# Patient Record
Sex: FEMALE
[De-identification: names, ages, dates, MRNs, and addresses within clinical notes are randomized per-mention and may not be internally consistent; named-entity substitution may affect disease eponyms.]

---

## 2020-05-27 ENCOUNTER — TRANSCRIPTION ENCOUNTER (OUTPATIENT)
Age: 55
End: 2020-05-27

## 2020-07-15 ENCOUNTER — APPOINTMENT (OUTPATIENT)
Dept: GYNECOLOGIC ONCOLOGY | Facility: CLINIC | Age: 55
End: 2020-07-15
Payer: COMMERCIAL

## 2020-07-15 VITALS
DIASTOLIC BLOOD PRESSURE: 85 MMHG | WEIGHT: 127 LBS | OXYGEN SATURATION: 99 % | HEART RATE: 106 BPM | HEIGHT: 62 IN | SYSTOLIC BLOOD PRESSURE: 157 MMHG | BODY MASS INDEX: 23.37 KG/M2

## 2020-07-15 DIAGNOSIS — I25.10 ATHEROSCLEROTIC HEART DISEASE OF NATIVE CORONARY ARTERY W/OUT ANGINA PECTORIS: ICD-10-CM

## 2020-07-15 DIAGNOSIS — Z80.6 FAMILY HISTORY OF LEUKEMIA: ICD-10-CM

## 2020-07-15 DIAGNOSIS — Z80.7 FAMILY HISTORY OF OTHER MALIGNANT NEOPLASMS OF LYMPHOID, HEMATOPOIETIC AND RELATED TISSUES: ICD-10-CM

## 2020-07-15 DIAGNOSIS — Z86.018 PERSONAL HISTORY OF OTHER BENIGN NEOPLASM: ICD-10-CM

## 2020-07-15 DIAGNOSIS — D86.9 SARCOIDOSIS, UNSPECIFIED: ICD-10-CM

## 2020-07-15 DIAGNOSIS — E78.5 HYPERLIPIDEMIA, UNSPECIFIED: ICD-10-CM

## 2020-07-15 DIAGNOSIS — Z78.9 OTHER SPECIFIED HEALTH STATUS: ICD-10-CM

## 2020-07-15 DIAGNOSIS — R91.8 OTHER NONSPECIFIC ABNORMAL FINDING OF LUNG FIELD: ICD-10-CM

## 2020-07-15 PROCEDURE — 57456 ENDOCERV CURETTAGE W/SCOPE: CPT | Mod: 59

## 2020-07-15 PROCEDURE — 99204 OFFICE O/P NEW MOD 45 MIN: CPT | Mod: 25

## 2020-07-15 RX ORDER — ASPIRIN 81 MG
81 TABLET, DELAYED RELEASE (ENTERIC COATED) ORAL
Refills: 0 | Status: ACTIVE | COMMUNITY

## 2020-07-15 RX ORDER — ROSUVASTATIN CALCIUM 10 MG/1
10 TABLET, FILM COATED ORAL
Refills: 0 | Status: ACTIVE | COMMUNITY

## 2020-07-15 NOTE — HISTORY OF PRESENT ILLNESS
[FreeTextEntry1] : This 56yo , c/s x 2, LMP at 51 referred by Dr. Gupta for an abnormal cervical pap smear on 6/15/20 showing ASCUS, HPV HR+. ECC on 20 revealed solely benign squamous mucosa. She is also followed for a small right ovarian cyst and stable echogenic focus in right ovary. Recent  was normal at 8. Patient reports she thinks she's had abnormal pap smears in the past which resolved. Denies LEEP or cryotherapy. Denies smoking. She has been under a great amount of stress as she was recently diagnosed with CAD and possible sarcoidosis. She is also very anxious about HPV and her abnormal pap smear. She believes she had recent negative HIV testing.  \par \par Mammo-2019-reports neg\par [id number]\par DEXA-2019

## 2020-07-15 NOTE — ASSESSMENT
[FreeTextEntry1] : We discussed the etiology of cervical dysplasia and the relationship between the human papilloma virus (HPV) and cervical dysplasia and cancer.  I explained that anogenital transmission of HPV normally follows mucosal-to-mucosal contact.  I also explained what is understood to date with regards to how the HPV virus causes the pathological abnormailities that are seen in cervical dysplasia and cancer.  In addition to HPV, we discussed other risk factors such as smoking, use of oral contraceptives, and induced or acquired immune dysfunction.\par

## 2020-07-15 NOTE — CHIEF COMPLAINT
[FreeTextEntry1] : [facility]\par \par [facility] [phone] at [address] [place] [phone]\par

## 2020-07-15 NOTE — PROCEDURE
[Colposcopy] : colposcopy [ASCUS] : ASCUS [HPV high risk] : PCR positive for high risk HPV [Patient] : the patient [No Abnormalities] : no abnormalities [Verbal Consent] : verbal consent was obtained prior to the procedure and is detailed in the patient's record [ECC Done] : an Endocervical curettage was performed.  [FreeTextEntry3] : Colposcopy of the cervix performed, mild cervical stenosis present, dilation performed with ECC brush

## 2020-07-15 NOTE — PHYSICAL EXAM
[Normal] : Uterus: Normal size, no tenderness, no masses [de-identified] : Patient was interviewed and examined with chaperone present. Name of chaperone: Nani East

## 2020-07-15 NOTE — END OF VISIT
[FreeTextEntry3] : Written by Nani LIMA, acting as a scribe for Dr. Gómez Dixon.\par This note accurately reflects the work and decisions made by me.\par

## 2020-07-20 LAB — CORE LAB BIOPSY: NORMAL

## 2020-11-12 ENCOUNTER — TRANSCRIPTION ENCOUNTER (OUTPATIENT)
Age: 55
End: 2020-11-12

## 2021-01-27 PROBLEM — R87.610 ASCUS WITH POSITIVE HIGH RISK HPV CERVICAL: Status: ACTIVE | Noted: 2020-07-15

## 2021-01-27 NOTE — PROCEDURE
[Cervical Pap] : cervical pap smear  [Abnormal Pap Smear] : an abnormal pap smear [Patient] : the patient [Verbal Consent] : verbal consent was obtained prior to the procedure and is detailed in the patient's record [Yes] : the specimen was sent to pathology [No Complications] : none [Tolerated Well] : the patient tolerated the procedure well

## 2021-01-29 ENCOUNTER — APPOINTMENT (OUTPATIENT)
Dept: GYNECOLOGIC ONCOLOGY | Facility: CLINIC | Age: 56
End: 2021-01-29
Payer: COMMERCIAL

## 2021-01-29 VITALS
RESPIRATION RATE: 16 BRPM | HEIGHT: 62 IN | DIASTOLIC BLOOD PRESSURE: 78 MMHG | BODY MASS INDEX: 23.37 KG/M2 | SYSTOLIC BLOOD PRESSURE: 131 MMHG | OXYGEN SATURATION: 100 % | HEART RATE: 95 BPM | WEIGHT: 127 LBS

## 2021-01-29 DIAGNOSIS — R87.810 ATYPICAL SQUAMOUS CELLS OF UNDETERMINED SIGNIFICANCE ON CYTOLOGIC SMEAR OF CERVIX (ASC-US): ICD-10-CM

## 2021-01-29 DIAGNOSIS — R87.610 ATYPICAL SQUAMOUS CELLS OF UNDETERMINED SIGNIFICANCE ON CYTOLOGIC SMEAR OF CERVIX (ASC-US): ICD-10-CM

## 2021-01-29 PROCEDURE — 99072 ADDL SUPL MATRL&STAF TM PHE: CPT

## 2021-01-29 PROCEDURE — 99214 OFFICE O/P EST MOD 30 MIN: CPT

## 2021-01-30 ENCOUNTER — TRANSCRIPTION ENCOUNTER (OUTPATIENT)
Age: 56
End: 2021-01-30

## 2021-02-01 LAB — HPV HIGH+LOW RISK DNA PNL CVX: NOT DETECTED

## 2021-02-02 NOTE — END OF VISIT
[FreeTextEntry3] : Written by Mandie Burrell, acting as a scribe for Dr. Gómez Dixon \par This note accurately reflects the work and decisions made by me.

## 2021-02-02 NOTE — ASSESSMENT
[FreeTextEntry1] : Cervical pap smear performed today. I have advised the patient to call my office next week for her pap smear results. Patient is aware that if it is abnormal she will have to return to discuss results. I advised the patient that if her results are normal, I am recommending she follow up with her primary gynecologist in 1 year to resume routine gynecological care. Patient stated she understood and agreed to comply.

## 2021-02-02 NOTE — REASON FOR VISIT
[FreeTextEntry1] : [place] Location \par \par [facility] [place]. [phone]\par [address] [place] [address]

## 2021-02-02 NOTE — PHYSICAL EXAM
[Normal] : Bimanual Exam: Normal [de-identified] : Mandie Burrell Medical assistant chaperoned during gynecologic exam.

## 2021-02-02 NOTE — HISTORY OF PRESENT ILLNESS
[FreeTextEntry1] : This 50 year old was referred in July by Dr. Gupta for an abnormal cervical pap smear from 6/15/2020 which showed ASCUS, HPV HR+. ECC on 6/30/2020 revealed soley benign squamous mucosa. Patient reported thinking she had abnormal pap smears in the past which resolved. I performed a colposcopy of the cervix and performed an ECC which I made the patient aware that it might come out insufficient due to cervical stenosis but that either way I was not concerned of a malignancy. ECC revealed superficial fragments of atrophic squamous epithelium with inflammation and reactive changes. Rare isolated cells compatible with endocervical epithelium. Patient returns to the office today for a 6 month follow up cervical pap smear. \par \par Mammogram- 9/10/2020 benign. \par DEXA 2019\par Colonoscopy 2017- negative

## 2021-02-05 LAB — CYTOLOGY CVX/VAG DOC THIN PREP: ABNORMAL

## 2021-03-25 ENCOUNTER — TRANSCRIPTION ENCOUNTER (OUTPATIENT)
Age: 56
End: 2021-03-25

## 2022-11-03 ENCOUNTER — OFFICE (OUTPATIENT)
Dept: URBAN - METROPOLITAN AREA CLINIC 109 | Facility: CLINIC | Age: 57
Setting detail: OPHTHALMOLOGY
End: 2022-11-03
Payer: COMMERCIAL

## 2022-11-03 DIAGNOSIS — H01.001: ICD-10-CM

## 2022-11-03 DIAGNOSIS — H01.002: ICD-10-CM

## 2022-11-03 DIAGNOSIS — H00.14: ICD-10-CM

## 2022-11-03 PROCEDURE — 92285 EXTERNAL OCULAR PHOTOGRAPHY: CPT | Performed by: OPHTHALMOLOGY

## 2022-11-03 PROCEDURE — 99213 OFFICE O/P EST LOW 20 MIN: CPT | Performed by: OPHTHALMOLOGY

## 2022-11-03 ASSESSMENT — CONFRONTATIONAL VISUAL FIELD TEST (CVF)
OD_FINDINGS: FULL
OS_FINDINGS: FULL

## 2022-11-03 ASSESSMENT — LID EXAM ASSESSMENTS: OD_BLEPHARITIS: RLL RUL 1+

## 2022-11-04 ASSESSMENT — REFRACTION_AUTOREFRACTION
OS_CYLINDER: -0.75
OS_AXIS: 083
OS_SPHERE: +1.75
OD_AXIS: 110
OD_CYLINDER: -1.25
OD_SPHERE: +1.75

## 2022-11-04 ASSESSMENT — KERATOMETRY
OS_AXISANGLE_DEGREES: 162
OS_K1POWER_DIOPTERS: 44.25
OD_K2POWER_DIOPTERS: 44.75
OD_K1POWER_DIOPTERS: 44.25
OD_AXISANGLE_DEGREES: 050
OS_K2POWER_DIOPTERS: 44.50

## 2022-11-04 ASSESSMENT — SPHEQUIV_DERIVED
OD_SPHEQUIV: 1.125
OS_SPHEQUIV: 1.375

## 2022-11-04 ASSESSMENT — VISUAL ACUITY
OD_BCVA: 20/50-2
OS_BCVA: 20/30-1

## 2022-11-04 ASSESSMENT — AXIALLENGTH_DERIVED
OS_AL: 22.7649
OD_AL: 22.8129

## 2023-03-03 ENCOUNTER — OFFICE (OUTPATIENT)
Dept: URBAN - METROPOLITAN AREA CLINIC 12 | Facility: CLINIC | Age: 58
Setting detail: OPHTHALMOLOGY
End: 2023-03-03
Payer: COMMERCIAL

## 2023-03-03 DIAGNOSIS — H43.393: ICD-10-CM

## 2023-03-03 DIAGNOSIS — D86.9: ICD-10-CM

## 2023-03-03 DIAGNOSIS — H40.013: ICD-10-CM

## 2023-03-03 DIAGNOSIS — L71.0: ICD-10-CM

## 2023-03-03 DIAGNOSIS — H25.13: ICD-10-CM

## 2023-03-03 DIAGNOSIS — H04.123: ICD-10-CM

## 2023-03-03 PROCEDURE — 92014 COMPRE OPH EXAM EST PT 1/>: CPT | Performed by: OPHTHALMOLOGY

## 2023-03-03 PROCEDURE — 92083 EXTENDED VISUAL FIELD XM: CPT | Performed by: OPHTHALMOLOGY

## 2023-03-03 PROCEDURE — 92133 CPTRZD OPH DX IMG PST SGM ON: CPT | Performed by: OPHTHALMOLOGY

## 2023-03-03 ASSESSMENT — SPHEQUIV_DERIVED
OD_SPHEQUIV: 1.125
OS_SPHEQUIV: 1.5

## 2023-03-03 ASSESSMENT — KERATOMETRY
OD_K2POWER_DIOPTERS: 44.25
OS_AXISANGLE_DEGREES: 170
OD_AXISANGLE_DEGREES: 031
OD_K1POWER_DIOPTERS: 44.00
OS_K2POWER_DIOPTERS: 44.75
OS_K1POWER_DIOPTERS: 44.00

## 2023-03-03 ASSESSMENT — REFRACTION_AUTOREFRACTION
OD_AXIS: 109
OS_SPHERE: +2.00
OS_AXIS: 084
OS_CYLINDER: -1.00
OD_CYLINDER: -1.25
OD_SPHERE: +1.75

## 2023-03-03 ASSESSMENT — PACHYMETRY
OD_CT_CORRECTION: 0
OD_CT_UM: 545
OS_CT_UM: 540
OS_CT_CORRECTION: 0

## 2023-03-03 ASSESSMENT — AXIALLENGTH_DERIVED
OD_AL: 22.9424
OS_AL: 22.7196

## 2023-03-03 ASSESSMENT — TONOMETRY
OS_IOP_MMHG: 16
OD_IOP_MMHG: 17

## 2023-03-03 ASSESSMENT — VISUAL ACUITY
OS_BCVA: 20/40+1
OD_BCVA: 20/50+1

## 2023-03-03 ASSESSMENT — CONFRONTATIONAL VISUAL FIELD TEST (CVF)
OS_FINDINGS: FULL
OD_FINDINGS: FULL

## 2023-03-05 ENCOUNTER — OFFICE (OUTPATIENT)
Dept: URBAN - METROPOLITAN AREA CLINIC 12 | Facility: CLINIC | Age: 58
Setting detail: OPHTHALMOLOGY
End: 2023-03-05
Payer: COMMERCIAL

## 2023-03-05 DIAGNOSIS — B30.9: ICD-10-CM

## 2023-03-05 PROBLEM — H11.151 PINGUECULA; RIGHT EYE: Status: ACTIVE | Noted: 2023-03-05

## 2023-03-05 PROCEDURE — 92012 INTRM OPH EXAM EST PATIENT: CPT | Performed by: OPTOMETRIST

## 2023-03-05 ASSESSMENT — REFRACTION_AUTOREFRACTION
OS_SPHERE: +2.00
OD_AXIS: 110
OS_CYLINDER: -1.00
OD_SPHERE: +1.75
OD_CYLINDER: -1.00
OS_AXIS: 082

## 2023-03-05 ASSESSMENT — AXIALLENGTH_DERIVED
OD_AL: 22.9398
OS_AL: 22.6348

## 2023-03-05 ASSESSMENT — SPHEQUIV_DERIVED
OS_SPHEQUIV: 1.5
OD_SPHEQUIV: 1.25

## 2023-03-05 ASSESSMENT — VISUAL ACUITY
OS_BCVA: 20/30
OD_BCVA: 20/50-1

## 2023-03-05 ASSESSMENT — KERATOMETRY
OD_AXISANGLE_DEGREES: 041
OD_K2POWER_DIOPTERS: 44.50
OD_K1POWER_DIOPTERS: 43.50
OS_AXISANGLE_DEGREES: 178
OS_K2POWER_DIOPTERS: 44.75
OS_K1POWER_DIOPTERS: 44.50

## 2023-03-05 ASSESSMENT — PACHYMETRY
OS_CT_CORRECTION: 0
OD_CT_CORRECTION: 0
OD_CT_UM: 545
OS_CT_UM: 540

## 2023-03-05 ASSESSMENT — CONFRONTATIONAL VISUAL FIELD TEST (CVF)
OD_FINDINGS: FULL
OS_FINDINGS: FULL

## 2024-03-08 ENCOUNTER — OFFICE (OUTPATIENT)
Dept: URBAN - METROPOLITAN AREA CLINIC 12 | Facility: CLINIC | Age: 59
Setting detail: OPHTHALMOLOGY
End: 2024-03-08
Payer: COMMERCIAL

## 2024-03-08 DIAGNOSIS — H40.013: ICD-10-CM

## 2024-03-08 DIAGNOSIS — D86.9: ICD-10-CM

## 2024-03-08 DIAGNOSIS — H04.123: ICD-10-CM

## 2024-03-08 DIAGNOSIS — L71.0: ICD-10-CM

## 2024-03-08 DIAGNOSIS — H10.45: ICD-10-CM

## 2024-03-08 DIAGNOSIS — H11.151: ICD-10-CM

## 2024-03-08 DIAGNOSIS — H25.13: ICD-10-CM

## 2024-03-08 DIAGNOSIS — H43.393: ICD-10-CM

## 2024-03-08 PROCEDURE — 92014 COMPRE OPH EXAM EST PT 1/>: CPT | Performed by: OPHTHALMOLOGY

## 2024-03-08 PROCEDURE — 92250 FUNDUS PHOTOGRAPHY W/I&R: CPT | Performed by: OPHTHALMOLOGY

## 2024-03-08 PROCEDURE — 92083 EXTENDED VISUAL FIELD XM: CPT | Performed by: OPHTHALMOLOGY

## 2024-03-08 ASSESSMENT — REFRACTION_CURRENTRX
OD_SPHERE: +2.00
OS_SPHERE: +3.25
OD_OVR_VA: 20/
OS_SPHERE: +2.00
OS_AXIS: 85
OD_CYLINDER: -0.50
OD_SPHERE: +3.25
OS_CYLINDER: -0.25
OD_AXIS: 121
OD_OVR_VA: 20/
OS_OVR_VA: 20/
OS_OVR_VA: 20/

## 2024-07-30 ENCOUNTER — OFFICE (OUTPATIENT)
Dept: URBAN - METROPOLITAN AREA CLINIC 12 | Facility: CLINIC | Age: 59
Setting detail: OPHTHALMOLOGY
End: 2024-07-30
Payer: COMMERCIAL

## 2024-07-30 DIAGNOSIS — H43.813: ICD-10-CM

## 2024-07-30 DIAGNOSIS — H43.393: ICD-10-CM

## 2024-07-30 PROCEDURE — 92014 COMPRE OPH EXAM EST PT 1/>: CPT | Performed by: OPHTHALMOLOGY

## 2024-07-30 ASSESSMENT — CONFRONTATIONAL VISUAL FIELD TEST (CVF)
OD_FINDINGS: FULL
OS_FINDINGS: FULL

## 2024-08-02 ENCOUNTER — OFFICE (OUTPATIENT)
Dept: URBAN - METROPOLITAN AREA CLINIC 88 | Facility: CLINIC | Age: 59
Setting detail: OPHTHALMOLOGY
End: 2024-08-02
Payer: COMMERCIAL

## 2024-08-02 DIAGNOSIS — H43.813: ICD-10-CM

## 2024-08-02 DIAGNOSIS — H43.393: ICD-10-CM

## 2024-08-02 PROCEDURE — 92201 OPSCPY EXTND RTA DRAW UNI/BI: CPT | Performed by: OPHTHALMOLOGY

## 2024-08-02 PROCEDURE — 92134 CPTRZ OPH DX IMG PST SGM RTA: CPT | Performed by: OPHTHALMOLOGY

## 2024-08-02 PROCEDURE — 92014 COMPRE OPH EXAM EST PT 1/>: CPT | Performed by: OPHTHALMOLOGY

## 2024-08-02 ASSESSMENT — CONFRONTATIONAL VISUAL FIELD TEST (CVF)
OD_FINDINGS: FULL
OS_FINDINGS: FULL

## 2024-09-11 ENCOUNTER — OFFICE (OUTPATIENT)
Dept: URBAN - METROPOLITAN AREA CLINIC 88 | Facility: CLINIC | Age: 59
Setting detail: OPHTHALMOLOGY
End: 2024-09-11
Payer: COMMERCIAL

## 2024-09-11 DIAGNOSIS — H43.393: ICD-10-CM

## 2024-09-11 DIAGNOSIS — H43.813: ICD-10-CM

## 2024-09-11 PROCEDURE — 92134 CPTRZ OPH DX IMG PST SGM RTA: CPT | Performed by: OPHTHALMOLOGY

## 2024-09-11 PROCEDURE — 92012 INTRM OPH EXAM EST PATIENT: CPT | Performed by: OPHTHALMOLOGY

## 2024-09-11 ASSESSMENT — CONFRONTATIONAL VISUAL FIELD TEST (CVF)
OS_FINDINGS: FULL
OD_FINDINGS: FULL

## 2025-03-12 ENCOUNTER — OFFICE (OUTPATIENT)
Dept: URBAN - METROPOLITAN AREA CLINIC 88 | Facility: CLINIC | Age: 60
Setting detail: OPHTHALMOLOGY
End: 2025-03-12
Payer: COMMERCIAL

## 2025-03-12 DIAGNOSIS — H43.393: ICD-10-CM

## 2025-03-12 DIAGNOSIS — H43.813: ICD-10-CM

## 2025-03-12 PROCEDURE — 92134 CPTRZ OPH DX IMG PST SGM RTA: CPT | Performed by: OPHTHALMOLOGY

## 2025-03-12 PROCEDURE — 92201 OPSCPY EXTND RTA DRAW UNI/BI: CPT | Performed by: OPHTHALMOLOGY

## 2025-03-12 PROCEDURE — 92014 COMPRE OPH EXAM EST PT 1/>: CPT | Performed by: OPHTHALMOLOGY

## 2025-03-12 ASSESSMENT — REFRACTION_AUTOREFRACTION
OD_AXIS: 107
OS_CYLINDER: -1.00
OS_AXIS: 079
OS_SPHERE: +2.00
OD_CYLINDER: -1.75
OD_SPHERE: +2.50

## 2025-03-12 ASSESSMENT — CONFRONTATIONAL VISUAL FIELD TEST (CVF)
OS_FINDINGS: FULL
OD_FINDINGS: FULL

## 2025-03-12 ASSESSMENT — KERATOMETRY
OD_K2POWER_DIOPTERS: 44.50
OD_K1POWER_DIOPTERS: 43.75
OS_AXISANGLE_DEGREES: 172
OD_AXISANGLE_DEGREES: 178
OS_K2POWER_DIOPTERS: 44.75
OS_K1POWER_DIOPTERS: 44.00

## 2025-03-12 ASSESSMENT — PACHYMETRY
OD_CT_UM: 545
OS_CT_CORRECTION: 0
OD_CT_CORRECTION: 0
OS_CT_UM: 540

## 2025-03-12 ASSESSMENT — VISUAL ACUITY
OD_BCVA: 20/50-
OS_BCVA: 20/40-

## 2025-03-12 ASSESSMENT — TONOMETRY
OD_IOP_MMHG: 14
OS_IOP_MMHG: 15

## 2025-05-05 ENCOUNTER — OFFICE (OUTPATIENT)
Dept: URBAN - METROPOLITAN AREA CLINIC 12 | Facility: CLINIC | Age: 60
Setting detail: OPHTHALMOLOGY
End: 2025-05-05
Payer: COMMERCIAL

## 2025-05-05 DIAGNOSIS — H01.004: ICD-10-CM

## 2025-05-05 DIAGNOSIS — H01.001: ICD-10-CM

## 2025-05-05 DIAGNOSIS — H00.11: ICD-10-CM

## 2025-05-05 PROCEDURE — 92012 INTRM OPH EXAM EST PATIENT: CPT | Performed by: OPHTHALMOLOGY

## 2025-05-05 ASSESSMENT — TONOMETRY
OD_IOP_MMHG: 19
OS_IOP_MMHG: 18

## 2025-05-05 ASSESSMENT — CONFRONTATIONAL VISUAL FIELD TEST (CVF)
OD_FINDINGS: FULL
OS_FINDINGS: FULL

## 2025-05-05 ASSESSMENT — KERATOMETRY
OD_K1POWER_DIOPTERS: 44.00
OD_K2POWER_DIOPTERS: 44.50
OS_AXISANGLE_DEGREES: 009
OS_K1POWER_DIOPTERS: 44.00
METHOD_AUTO_MANUAL: MANUAL
OS_K2POWER_DIOPTERS: 44.75
OD_AXISANGLE_DEGREES: 027

## 2025-05-05 ASSESSMENT — REFRACTION_AUTOREFRACTION
OD_AXIS: 107
OS_CYLINDER: -1.00
OS_AXIS: 088
OD_SPHERE: +2.25
OD_CYLINDER: -1.25
OS_SPHERE: +2.25

## 2025-05-05 ASSESSMENT — PACHYMETRY
OD_CT_CORRECTION: 0
OS_CT_UM: 540
OD_CT_UM: 545
OS_CT_CORRECTION: 0

## 2025-05-05 ASSESSMENT — VISUAL ACUITY
OD_BCVA: 20/50-2
OS_BCVA: 20/50+2

## 2025-05-05 ASSESSMENT — LID EXAM ASSESSMENTS
OD_BLEPHARITIS: RUL
OS_BLEPHARITIS: LUL

## 2025-05-30 ENCOUNTER — DOCTOR'S OFFICE (OUTPATIENT)
Facility: LOCATION | Age: 60
Setting detail: OPHTHALMOLOGY
End: 2025-05-30
Payer: COMMERCIAL

## 2025-05-30 DIAGNOSIS — H00.11: ICD-10-CM

## 2025-05-30 DIAGNOSIS — H04.123: ICD-10-CM

## 2025-05-30 DIAGNOSIS — H01.004: ICD-10-CM

## 2025-05-30 DIAGNOSIS — H01.001: ICD-10-CM

## 2025-05-30 PROCEDURE — 92285 EXTERNAL OCULAR PHOTOGRAPHY: CPT | Performed by: OPHTHALMOLOGY

## 2025-05-30 PROCEDURE — 92012 INTRM OPH EXAM EST PATIENT: CPT | Performed by: OPHTHALMOLOGY

## 2025-05-30 ASSESSMENT — KERATOMETRY
OD_AXISANGLE_DEGREES: 027
OS_K2POWER_DIOPTERS: 44.75
OD_K1POWER_DIOPTERS: 44.00
METHOD_AUTO_MANUAL: MANUAL
OS_AXISANGLE_DEGREES: 009
OS_K1POWER_DIOPTERS: 44.00
OD_K2POWER_DIOPTERS: 44.50

## 2025-05-30 ASSESSMENT — REFRACTION_AUTOREFRACTION
OS_CYLINDER: -1.00
OD_SPHERE: +2.25
OD_AXIS: 107
OS_AXIS: 088
OS_SPHERE: +2.25
OD_CYLINDER: -1.25

## 2025-05-30 ASSESSMENT — LID EXAM ASSESSMENTS
OS_BLEPHARITIS: LUL
OD_BLEPHARITIS: RUL

## 2025-05-30 ASSESSMENT — VISUAL ACUITY
OS_BCVA: 20/40-
OD_BCVA: 20/50-1+2

## 2025-05-30 ASSESSMENT — CONFRONTATIONAL VISUAL FIELD TEST (CVF)
OS_FINDINGS: FULL
OD_FINDINGS: FULL

## 2025-07-30 ENCOUNTER — RESULT REVIEW (OUTPATIENT)
Age: 60
End: 2025-07-30

## 2025-07-30 ENCOUNTER — APPOINTMENT (OUTPATIENT)
Dept: HEMATOLOGY ONCOLOGY | Facility: CLINIC | Age: 60
End: 2025-07-30